# Patient Record
Sex: MALE | URBAN - METROPOLITAN AREA
[De-identification: names, ages, dates, MRNs, and addresses within clinical notes are randomized per-mention and may not be internally consistent; named-entity substitution may affect disease eponyms.]

---

## 2018-04-08 ENCOUNTER — HOSPITAL ENCOUNTER (EMERGENCY)
Facility: CLINIC | Age: 28
Discharge: HOME | End: 2018-04-09
Attending: EMERGENCY MEDICINE | Admitting: EMERGENCY MEDICINE
Payer: COMMERCIAL

## 2018-04-08 DIAGNOSIS — F10.920 ALCOHOLIC INTOXICATION WITHOUT COMPLICATION (H): ICD-10-CM

## 2018-04-08 LAB
ALBUMIN UR-MCNC: NEGATIVE MG/DL
ANION GAP SERPL CALCULATED.3IONS-SCNC: 9 MMOL/L (ref 3–14)
APPEARANCE UR: CLEAR
BILIRUB UR QL STRIP: NEGATIVE
BUN SERPL-MCNC: 5 MG/DL (ref 7–30)
CALCIUM SERPL-MCNC: 8.3 MG/DL (ref 8.5–10.1)
CHLORIDE SERPL-SCNC: 109 MMOL/L (ref 94–109)
CO2 SERPL-SCNC: 25 MMOL/L (ref 20–32)
COLOR UR AUTO: COLORLESS
CREAT SERPL-MCNC: 0.65 MG/DL (ref 0.66–1.25)
ETHANOL SERPL-MCNC: 0.31 G/DL
GFR SERPL CREATININE-BSD FRML MDRD: >90 ML/MIN/1.7M2
GLUCOSE SERPL-MCNC: 98 MG/DL (ref 70–99)
GLUCOSE UR STRIP-MCNC: NEGATIVE MG/DL
HGB UR QL STRIP: NEGATIVE
KETONES UR STRIP-MCNC: NEGATIVE MG/DL
LEUKOCYTE ESTERASE UR QL STRIP: NEGATIVE
NITRATE UR QL: NEGATIVE
PH UR STRIP: 6 PH (ref 5–7)
POTASSIUM SERPL-SCNC: 3.7 MMOL/L (ref 3.4–5.3)
RBC #/AREA URNS AUTO: <1 /HPF (ref 0–2)
SODIUM SERPL-SCNC: 143 MMOL/L (ref 133–144)
SOURCE: ABNORMAL
SP GR UR STRIP: 1 (ref 1–1.03)
UROBILINOGEN UR STRIP-MCNC: 0 MG/DL (ref 0–2)
WBC #/AREA URNS AUTO: 1 /HPF (ref 0–5)

## 2018-04-08 PROCEDURE — 80048 BASIC METABOLIC PNL TOTAL CA: CPT | Performed by: EMERGENCY MEDICINE

## 2018-04-08 PROCEDURE — 81001 URINALYSIS AUTO W/SCOPE: CPT | Performed by: EMERGENCY MEDICINE

## 2018-04-08 PROCEDURE — 76705 ECHO EXAM OF ABDOMEN: CPT

## 2018-04-08 PROCEDURE — 25000128 H RX IP 250 OP 636: Performed by: EMERGENCY MEDICINE

## 2018-04-08 PROCEDURE — 80320 DRUG SCREEN QUANTALCOHOLS: CPT | Performed by: EMERGENCY MEDICINE

## 2018-04-08 PROCEDURE — 99284 EMERGENCY DEPT VISIT MOD MDM: CPT | Mod: 25

## 2018-04-08 RX ORDER — SODIUM CHLORIDE 9 MG/ML
INJECTION, SOLUTION INTRAVENOUS CONTINUOUS
Status: DISCONTINUED | OUTPATIENT
Start: 2018-04-08 | End: 2018-04-09 | Stop reason: HOSPADM

## 2018-04-08 RX ADMIN — SODIUM CHLORIDE 1000 ML: 9 INJECTION, SOLUTION INTRAVENOUS at 23:24

## 2018-04-08 ASSESSMENT — ENCOUNTER SYMPTOMS
ARTHRALGIAS: 0
NECK PAIN: 0
BACK PAIN: 0

## 2018-04-08 NOTE — ED AVS SNAPSHOT
[facility] Emergency Department    [address]    [facility] [address]    Phone:  [phone]    Fax:  [phone]                                       Richard Miller   MRN: [id number]    Department:  [facility] Emergency Department   Date of Visit:  4/8/2018           After Visit Summary Signature Page     I have received my discharge instructions, and my questions have been answered. I have discussed any challenges I see with this plan with the nurse or doctor.    ..........................................................................................................................................  Patient/Patient Representative Signature      ..........................................................................................................................................  Patient Representative Print Name and Relationship to Patient    ..................................................               ................................................  Date                                            Time    ..........................................................................................................................................  Reviewed by Signature/Title    ...................................................              ..............................................  Date                                                            Time
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
## 2018-04-09 VITALS
DIASTOLIC BLOOD PRESSURE: 73 MMHG | TEMPERATURE: 97.4 F | HEART RATE: 85 BPM | SYSTOLIC BLOOD PRESSURE: 111 MMHG | RESPIRATION RATE: 16 BRPM | OXYGEN SATURATION: 100 %

## 2018-04-09 PROCEDURE — 25000128 H RX IP 250 OP 636: Performed by: EMERGENCY MEDICINE

## 2018-04-09 RX ADMIN — SODIUM CHLORIDE 1000 ML: 9 INJECTION, SOLUTION INTRAVENOUS at 00:18
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
## 2018-04-09 NOTE — DISCHARGE INSTRUCTIONS
Intoxicación Por Alcohol [Alcohol Intoxication]  La intoxicación por alcohol se da cuando usted arianne alcohol más rápido de lo que el hígado puede trabajar para eliminarlo del sistema. La intoxicación por alcohol afecta gomez juicio (pensamientos) y gomez coordinación. Un nivel muy alto de alcohol en la fabiola puede provocar un coma, hacer que la respiración se vuelva muy lenta, e incluso puede causarle la muerte.  Si andre alcohol todos los días, eso puede ir, poco a poco, causándole daños permanentes en el hígado, el cerebro, el corazón, el páncreas y otros órganos. El consumo de alcohol jonathan el embarazo puede ocasionar daños permanentes al bebé que está creciendo.  Cuidados En [place]:    No rex más alcohol.    NO CONDUZCA hasta que hayan desaparecido los efectos del alcohol.    Descanse mucho en los próximos días. Rex abundantes líquidos y otras bebidas no alcohólicas. Trate de comer a intervalos regulares.    Si ha estado bebiendo mucho todos los días, es posible que sienta el sîndrome de abstinencia del alcohol (alcohol withdrawl). A esto también se lo conoce ayush  la temblorina  (temblores emelina) o  delirium tremens  (DTs). Los síntomas usuales parker de 3 a 4 días y pueden incluir nerviosismo (nervousness), temblores (shakiness), náuseas (nausea), sudor (sweating) o insomnio (no poder dormir [sleeplessness]). Lo mejor jonathan fady período es que usted permanezca con gomez adonay o con amigos que puedan ayudarlo y contenerlo. También puede inscribirse en algún programa residencial de desintoxicación (residential detox program). Si nathalia síntomas son graves, comuníquese con gomez médico para que le recete algún medicamento que pueda ayudarlo.  Visita De Control:  Si el alcohol le está causando problemas, alguna de las siguientes organizaciones, entre otras, pueden serle de ayuda:  Alcohólicos Anónimos (Alcoholics Anonymous) ofrece contención a través de grupos de autoayuda. No se cobran cuotas ni honorarios. Consulte  "las Páginas [place] y llame para conocer el horario y el lugar donde se realizan las reuniones. www.aa.org  [place] ofrece apoyo para alcohólicos y familiares de alcohólicos. 792.121.5408 www.al-anon.org  National [place] On Alcoholism And Drug Dependence (Consejo Nacional sobre Alcoholismo y Drogadicción) 693.711.9666 www.ncadd.org  Existen también programas hospitalización o residenciales de desintoxicación alcohólica. Consulte las Páginas Kiki, en la sección \"Drug Abuse & Treatment Centers\" (Drogadicción y Centros de Tratamiento).  Busque Prontamente Atención Médica  si algo de lo siguiente ocurre:    Temblores emelina o convulsiones (seizure).    Fiebre superior a los 100.4  F (38.0  C).    Confusión o alucinaciones (patricia, oír, sentir cosas que no están ahí presentes).    Mayor dolor en la parte superior del abdomen.    Vómito persistente o presencia de fabiola en el vómito.  Date Last Reviewed: 11/28/2013 2000-2017 [employer]. [address] [place] [address]. Todos los derechos reservados. Esta información no pretende sustituir la atención médica profesional. Sólo gomez médico puede diagnosticar y tratar un problema de melania.        "

## 2018-04-09 NOTE — ED NOTES
Pt attempted to call family, but his phone ran out of battery. Pt's phone charged at ED desk and returned to pt.

## 2018-04-09 NOTE — ED NOTES
Bed: ED06  Expected date: 4/8/18  Expected time: 10:25 PM  Means of arrival: Ambulance  Comments:  Carly Johansen

## 2018-04-09 NOTE — ED NOTES
Patient walked with a steady gait to and from the bathroom . Patient has family in the room at this time.